# Patient Record
Sex: MALE | Race: OTHER | HISPANIC OR LATINO
[De-identification: names, ages, dates, MRNs, and addresses within clinical notes are randomized per-mention and may not be internally consistent; named-entity substitution may affect disease eponyms.]

---

## 2018-08-22 ENCOUNTER — APPOINTMENT (OUTPATIENT)
Dept: INTERNAL MEDICINE | Facility: CLINIC | Age: 43
End: 2018-08-22
Payer: MEDICAID

## 2018-08-22 VITALS
SYSTOLIC BLOOD PRESSURE: 108 MMHG | BODY MASS INDEX: 26.22 KG/M2 | HEIGHT: 69 IN | DIASTOLIC BLOOD PRESSURE: 72 MMHG | OXYGEN SATURATION: 96 % | HEART RATE: 70 BPM | TEMPERATURE: 98.8 F | WEIGHT: 177 LBS

## 2018-08-22 DIAGNOSIS — B00.1 HERPESVIRAL VESICULAR DERMATITIS: ICD-10-CM

## 2018-08-22 DIAGNOSIS — N50.89 OTHER SPECIFIED DISORDERS OF THE MALE GENITAL ORGANS: ICD-10-CM

## 2018-08-22 PROCEDURE — 36415 COLL VENOUS BLD VENIPUNCTURE: CPT

## 2018-08-22 PROCEDURE — 99214 OFFICE O/P EST MOD 30 MIN: CPT | Mod: 25

## 2018-08-26 NOTE — HISTORY OF PRESENT ILLNESS
[FreeTextEntry8] : \par Here for evaluation of skin rash in left side of face and scrotum.  He states he has had rash on face for past 3-4 days.  He states it is itchy like pimples.  He states there are located on left cheek.  he has never had this before.  He denies any redness , swelling, or pain.  No oral lesions noted.  he states he has a similar lesion on scrotum which appeared around the same time. he states it is a little itchy but does not hurt.  he states he is sexually active with one female partner.  he denies hx of STD\par \par He states he still has lipoma of left upper arm and wants it removed

## 2018-08-26 NOTE — REVIEW OF SYSTEMS
[Genital Lesion] : genital lesion [see HPI] : see HPI [Skin Lesions] : skin lesion [Itching] : itching [Negative] : Musculoskeletal [Fever] : no fever [Chills] : no chills [Feeling Poorly] : not feeling poorly [Feeling Tired] : not feeling tired [Recent Weight Loss (___ Lbs)] : no recent weight loss [Sore Throat] : no sore throat [Chest Pain] : no chest pain [Palpitations] : no palpitations [Lower Ext Edema] : no extremity edema [Shortness Of Breath] : no shortness of breath [Cough] : no cough [SOB on Exertion] : no shortness of breath during exertion [Abdominal Pain] : no abdominal pain [Vomiting] : no vomiting [Diarrhea] : no diarrhea [Dysuria] : no dysuria [Testicular Pain] : no testicular pain [Anxiety] : no anxiety [Depression] : no depression

## 2018-08-26 NOTE — PHYSICAL EXAM
[General Appearance - Alert] : alert [General Appearance - In No Acute Distress] : in no acute distress [General Appearance - Well Nourished] : well nourished [General Appearance - Well Developed] : well developed [General Appearance - Well-Appearing] : healthy appearing [Sclera] : the sclera and conjunctiva were normal [PERRL With Normal Accommodation] : pupils were equal in size, round, and reactive to light [Oropharynx] : the oropharynx was normal [Neck Appearance] : the appearance of the neck was normal [Auscultation Breath Sounds / Voice Sounds] : lungs were clear to auscultation bilaterally [Heart Rate And Rhythm] : heart rate was normal and rhythm regular [Heart Sounds] : normal S1 and S2 [Heart Sounds Gallop] : no gallops [Murmurs] : no murmurs [Heart Sounds Pericardial Friction Rub] : no pericardial rub [Edema] : there was no peripheral edema [Bowel Sounds] : normal bowel sounds [Abdomen Soft] : soft [Abdomen Tenderness] : non-tender [] : no hepato-splenomegaly [Abdomen Mass (___ Cm)] : no abdominal mass palpated [No Focal Deficits] : no focal deficits [Oriented To Time, Place, And Person] : oriented to person, place, and time [Affect] : the affect was normal [Mood] : the mood was normal [Outer Ear] : the ears and nose were normal in appearance [Jugular Venous Distention Increased] : there was no jugular-venous distention [FreeTextEntry1] : on left facial cheek along jaw limp he has a few scattered vesicles and pusules with some scabbing.  On penis her has a vesicular skin lesion with pustule, on shaft of penis he has skin colored soft irregular shaped skin lesions which may be warts.  on left arm her has a 2 cmx 2 cm skin colored soft tissue mass, no erythema

## 2018-08-26 NOTE — ASSESSMENT
[FreeTextEntry1] : \par Genital lesion: possible HSV and genital warts\par -I explained to pt that this could be genital herpes which is STD\par -will check labs\par -Will start valtrex 500mg PO BID x 3 days\par -I adv pt he should discuss with sexual partners\par -I have adv he see dermatology\par \par Left facial skin lesion:\par -impetigo vs herpes vs acne\par -will tx with valtrex and course of doxycycline (R/B/A/side effects discussed and side effects including pill esophagitis and photosensitivity discussed)\par \par -he is to notify office if sx persist or worsen\par -I have adv he see dermatology\par \par Hearing loss/tympanosclerosis:\par -he states he saw ENT and was given drops\par -he states sx resolved\par \par Lipoma or left upper extremity:\par -referred to general surgery\par \par + Depression screening: PHQ 9 score 2\par -he states he does not feel depressed or anxious\par \par Pre-DM:\par -last HgA1c 6.0\par -will check labs\par \par HCM:\par \par CPE was advised\par \par Flu shot advised: pt refused previously\par \par HIV testing offered: he consented to testing 2018\par \par Lipids at goal 2016\par \par Depression screenin2018 PHQ 9 score 2\par \par F/U 2-3 weeks for CPE and lab review

## 2018-08-31 LAB
25(OH)D3 SERPL-MCNC: 28.9 NG/ML
ALBUMIN SERPL ELPH-MCNC: 4.9 G/DL
ALP BLD-CCNC: 64 U/L
ALT SERPL-CCNC: 30 U/L
ANION GAP SERPL CALC-SCNC: 15 MMOL/L
APPEARANCE: CLEAR
AST SERPL-CCNC: 31 U/L
BACTERIA: NEGATIVE
BASOPHILS # BLD AUTO: 0.02 K/UL
BASOPHILS NFR BLD AUTO: 0.4 %
BILIRUB SERPL-MCNC: 0.6 MG/DL
BILIRUBIN URINE: NEGATIVE
BLOOD URINE: NEGATIVE
BUN SERPL-MCNC: 18 MG/DL
C TRACH RRNA SPEC QL NAA+PROBE: NOT DETECTED
CALCIUM SERPL-MCNC: 9.9 MG/DL
CHLORIDE SERPL-SCNC: 101 MMOL/L
CHOLEST SERPL-MCNC: 230 MG/DL
CHOLEST/HDLC SERPL: 5.1 RATIO
CO2 SERPL-SCNC: 25 MMOL/L
COLOR: YELLOW
CREAT SERPL-MCNC: 1.15 MG/DL
EOSINOPHIL # BLD AUTO: 0.14 K/UL
EOSINOPHIL NFR BLD AUTO: 3.1 %
GLUCOSE QUALITATIVE U: NEGATIVE MG/DL
GLUCOSE SERPL-MCNC: 129 MG/DL
HBA1C MFR BLD HPLC: 5.9 %
HBV CORE IGG+IGM SER QL: NONREACTIVE
HBV SURFACE AB SER QL: NONREACTIVE
HBV SURFACE AG SER QL: NONREACTIVE
HCT VFR BLD CALC: 45.8 %
HCV AB SER QL: NONREACTIVE
HCV S/CO RATIO: 0.16 S/CO
HDLC SERPL-MCNC: 45 MG/DL
HGB BLD-MCNC: 15.1 G/DL
HIV1+2 AB SPEC QL IA.RAPID: NONREACTIVE
HSV 1+2 IGG SER IA-IMP: NEGATIVE
HSV 1+2 IGG SER IA-IMP: POSITIVE
HSV1 IGG SER QL: 33.6 INDEX
HSV2 IGG SER QL: 0.09 INDEX
IMM GRANULOCYTES NFR BLD AUTO: 0 %
KETONES URINE: NEGATIVE
LDLC SERPL CALC-MCNC: 160 MG/DL
LEUKOCYTE ESTERASE URINE: NEGATIVE
LYMPHOCYTES # BLD AUTO: 2.02 K/UL
LYMPHOCYTES NFR BLD AUTO: 44 %
MAN DIFF?: NORMAL
MCHC RBC-ENTMCNC: 30.8 PG
MCHC RBC-ENTMCNC: 33 GM/DL
MCV RBC AUTO: 93.5 FL
MICROSCOPIC-UA: NORMAL
MONOCYTES # BLD AUTO: 0.21 K/UL
MONOCYTES NFR BLD AUTO: 4.6 %
N GONORRHOEA RRNA SPEC QL NAA+PROBE: NOT DETECTED
NEUTROPHILS # BLD AUTO: 2.2 K/UL
NEUTROPHILS NFR BLD AUTO: 47.9 %
NITRITE URINE: NEGATIVE
PH URINE: 6
PLATELET # BLD AUTO: 253 K/UL
POTASSIUM SERPL-SCNC: 4.7 MMOL/L
PROT SERPL-MCNC: 7.5 G/DL
PROTEIN URINE: NEGATIVE MG/DL
PSA SERPL-MCNC: 0.45 NG/ML
RBC # BLD: 4.9 M/UL
RBC # FLD: 12.8 %
RED BLOOD CELLS URINE: 1 /HPF
SODIUM SERPL-SCNC: 141 MMOL/L
SOURCE AMPLIFICATION: NORMAL
SPECIFIC GRAVITY URINE: 1.02
SQUAMOUS EPITHELIAL CELLS: 0 /HPF
T PALLIDUM AB SER QL IA: NEGATIVE
T4 FREE SERPL-MCNC: 1.4 NG/DL
TRIGL SERPL-MCNC: 126 MG/DL
TSH SERPL-ACNC: 1.29 UIU/ML
UROBILINOGEN URINE: NEGATIVE MG/DL
WBC # FLD AUTO: 4.59 K/UL
WHITE BLOOD CELLS URINE: 0 /HPF

## 2018-10-03 ENCOUNTER — NON-APPOINTMENT (OUTPATIENT)
Age: 43
End: 2018-10-03

## 2018-10-03 ENCOUNTER — APPOINTMENT (OUTPATIENT)
Dept: INTERNAL MEDICINE | Facility: CLINIC | Age: 43
End: 2018-10-03
Payer: MEDICAID

## 2018-10-03 VITALS
HEIGHT: 69 IN | SYSTOLIC BLOOD PRESSURE: 108 MMHG | HEART RATE: 69 BPM | TEMPERATURE: 98.6 F | OXYGEN SATURATION: 98 % | WEIGHT: 176 LBS | DIASTOLIC BLOOD PRESSURE: 74 MMHG | BODY MASS INDEX: 26.07 KG/M2

## 2018-10-03 DIAGNOSIS — L08.9 LOCAL INFECTION OF THE SKIN AND SUBCUTANEOUS TISSUE, UNSPECIFIED: ICD-10-CM

## 2018-10-03 DIAGNOSIS — M25.562 PAIN IN LEFT KNEE: ICD-10-CM

## 2018-10-03 DIAGNOSIS — H74.02 TYMPANOSCLEROSIS, LEFT EAR: ICD-10-CM

## 2018-10-03 DIAGNOSIS — Z87.39 PERSONAL HISTORY OF OTHER DISEASES OF THE MUSCULOSKELETAL SYSTEM AND CONNECTIVE TISSUE: ICD-10-CM

## 2018-10-03 DIAGNOSIS — M25.522 PAIN IN LEFT ELBOW: ICD-10-CM

## 2018-10-03 DIAGNOSIS — Z86.69 PERSONAL HISTORY OF OTHER DISEASES OF THE NERVOUS SYSTEM AND SENSE ORGANS: ICD-10-CM

## 2018-10-03 PROCEDURE — 90471 IMMUNIZATION ADMIN: CPT

## 2018-10-03 PROCEDURE — 93000 ELECTROCARDIOGRAM COMPLETE: CPT

## 2018-10-03 PROCEDURE — 90715 TDAP VACCINE 7 YRS/> IM: CPT

## 2018-10-03 PROCEDURE — 99396 PREV VISIT EST AGE 40-64: CPT | Mod: 25

## 2018-10-03 RX ORDER — DOXYCYCLINE HYCLATE 100 MG/1
100 TABLET ORAL
Qty: 14 | Refills: 0 | Status: DISCONTINUED | COMMUNITY
Start: 2018-08-22 | End: 2018-10-03

## 2018-10-03 RX ORDER — DOXYCYCLINE 100 MG/1
100 TABLET, FILM COATED ORAL
Qty: 14 | Refills: 0 | Status: DISCONTINUED | COMMUNITY
Start: 2018-08-22 | End: 2018-10-03

## 2018-10-03 NOTE — ASSESSMENT
[FreeTextEntry1] : \par Genital lesions:\par -HSV lesions have resolved\par -he still has what appears to be genital warts\par -I explained to pt again that is was likely STD and he needs to see dermatologist for treatment\par -I also adv he notify sexual partner as she should be seeing her GYN for routine GYN exams and PAPs\par \par Left facial skin lesion:\par -has resolved\par \par Lipoma or left upper extremity:\par -referred to general surgery last visit\par \par Pre-DM:\par -last HgA1c 5.9  and fasting glucose 129 2018\par -I advised low fat/low cholesterol diet and exercise\par \par Hyperlipidemia\par -I advised low fat/low cholesterol diet\par \par Vitamin D deficiency\par -I advised OTC vitamin D 3 1000 units daily\par \par HCM:\par \par CPE 10/3/2018\par \par EKG 10/3/2018\par \par Flu shot advised: pt refused 10/3/2018\par \par Tdap: advised.  R/B discussed.  Tdap given today 10/3/2018\par \par HIV testing: negative 2018\par \par Hepatitis C screenin2018 negative\par \par Hepatitis B: not immune-will check with insurance company to see if hepatitis B vaccine is covered\par \par Depression screening: 10/3/2018 PHQ 2 score 0 (Negative)\par \par PSA 0.45 2018\par \par F/U 3 months for repeat fasting labs

## 2018-10-03 NOTE — PHYSICAL EXAM
[No Acute Distress] : no acute distress [Well Nourished] : well nourished [Well Developed] : well developed [Well-Appearing] : well-appearing [Normal Voice/Communication] : normal voice/communication [Normal Sclera/Conjunctiva] : normal sclera/conjunctiva [PERRL] : pupils equal round and reactive to light [EOMI] : extraocular movements intact [Normal Outer Ear/Nose] : the outer ears and nose were normal in appearance [Normal Oropharynx] : the oropharynx was normal [Normal TMs] : both tympanic membranes were normal [Normal Nasal Mucosa] : the nasal mucosa was normal [No JVD] : no jugular venous distention [Supple] : supple [No Lymphadenopathy] : no lymphadenopathy [Thyroid Normal, No Nodules] : the thyroid was normal and there were no nodules present [No Respiratory Distress] : no respiratory distress  [Clear to Auscultation] : lungs were clear to auscultation bilaterally [No Accessory Muscle Use] : no accessory muscle use [Normal Rate] : normal rate  [Regular Rhythm] : with a regular rhythm [Normal S1, S2] : normal S1 and S2 [No Murmur] : no murmur heard [No Carotid Bruits] : no carotid bruits [No Edema] : there was no peripheral edema [No Extremity Clubbing/Cyanosis] : no extremity clubbing/cyanosis [Soft] : abdomen soft [Non Tender] : non-tender [Non-distended] : non-distended [No Masses] : no abdominal mass palpated [No HSM] : no HSM [Normal Bowel Sounds] : normal bowel sounds [No CVA Tenderness] : no CVA  tenderness [No Spinal Tenderness] : no spinal tenderness [No Joint Swelling] : no joint swelling [No Focal Deficits] : no focal deficits [Normal Affect] : the affect was normal [Alert and Oriented x3] : oriented to person, place, and time [Normal Mood] : the mood was normal [de-identified] : on forskin of penis he has multiple round slihgly raised pink/flesh colored papular lesion

## 2018-10-03 NOTE — HEALTH RISK ASSESSMENT
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

## 2018-10-03 NOTE — REVIEW OF SYSTEMS
[Negative] : Heme/Lymph [Fever] : no fever [Chills] : no chills [Fatigue] : no fatigue [Recent Change In Weight] : ~T no recent weight change [Earache] : no earache [Nasal Discharge] : no nasal discharge [Sore Throat] : no sore throat [Chest Pain] : no chest pain [Palpitations] : no palpitations [Lower Ext Edema] : no lower extremity edema [Shortness Of Breath] : no shortness of breath [Wheezing] : no wheezing [Cough] : no cough [Dyspnea on Exertion] : not dyspnea on exertion [Abdominal Pain] : no abdominal pain [Nausea] : no nausea [Diarrhea] : no diarrhea [Dysuria] : no dysuria [Hematuria] : no hematuria [Frequency] : no frequency [Anxiety] : no anxiety [Depression] : no depression [FreeTextEntry8] : see HPI [de-identified] : see HPI

## 2018-10-03 NOTE — HISTORY OF PRESENT ILLNESS
[de-identified] : Here for CPE\par \par He states he overall feels well\par \par He states he still reports some skin lesions on his foreskin which hurt and stretch when he gets an erection.  He denies penile discharge.  he states he has made appt to see dermatologist

## 2019-01-09 ENCOUNTER — APPOINTMENT (OUTPATIENT)
Dept: INTERNAL MEDICINE | Facility: CLINIC | Age: 44
End: 2019-01-09

## 2019-04-24 ENCOUNTER — APPOINTMENT (OUTPATIENT)
Dept: INTERNAL MEDICINE | Facility: CLINIC | Age: 44
End: 2019-04-24
Payer: MEDICAID

## 2019-04-24 VITALS
WEIGHT: 181 LBS | HEART RATE: 78 BPM | TEMPERATURE: 98.2 F | RESPIRATION RATE: 15 BRPM | HEIGHT: 69 IN | BODY MASS INDEX: 26.81 KG/M2 | DIASTOLIC BLOOD PRESSURE: 70 MMHG | OXYGEN SATURATION: 98 % | SYSTOLIC BLOOD PRESSURE: 124 MMHG

## 2019-04-24 DIAGNOSIS — N20.0 CALCULUS OF KIDNEY: ICD-10-CM

## 2019-04-24 DIAGNOSIS — Z23 ENCOUNTER FOR IMMUNIZATION: ICD-10-CM

## 2019-04-24 PROCEDURE — 99214 OFFICE O/P EST MOD 30 MIN: CPT | Mod: 25

## 2019-04-24 PROCEDURE — 96127 BRIEF EMOTIONAL/BEHAV ASSMT: CPT

## 2019-04-24 NOTE — PHYSICAL EXAM
[No Acute Distress] : no acute distress [Well-Appearing] : well-appearing [Normal Voice/Communication] : normal voice/communication [Normal Sclera/Conjunctiva] : normal sclera/conjunctiva [PERRL] : pupils equal round and reactive to light [Normal Oropharynx] : the oropharynx was normal [No JVD] : no jugular venous distention [Supple] : supple [No Lymphadenopathy] : no lymphadenopathy [No Respiratory Distress] : no respiratory distress  [Thyroid Normal, No Nodules] : the thyroid was normal and there were no nodules present [Clear to Auscultation] : lungs were clear to auscultation bilaterally [No Accessory Muscle Use] : no accessory muscle use [Normal Rate] : normal rate  [Regular Rhythm] : with a regular rhythm [Normal S1, S2] : normal S1 and S2 [No Murmur] : no murmur heard [No Edema] : there was no peripheral edema [Soft] : abdomen soft [No Extremity Clubbing/Cyanosis] : no extremity clubbing/cyanosis [Non Tender] : non-tender [Non-distended] : non-distended [No Masses] : no abdominal mass palpated [No HSM] : no HSM [Normal Bowel Sounds] : normal bowel sounds [No Spinal Tenderness] : no spinal tenderness [No CVA Tenderness] : no CVA  tenderness [No Focal Deficits] : no focal deficits [Normal Affect] : the affect was normal [Alert and Oriented x3] : oriented to person, place, and time [Normal Mood] : the mood was normal [No Rash] : no rash [Normal Insight/Judgement] : insight and judgment were intact

## 2019-04-24 NOTE — END OF VISIT
[FreeTextEntry3] : All medical entries made by the Scribe were at my, Dr. Avery Monte's, direction and personally dictated by me on [4/24/19]. I have reviewed the chart and agree that the record accurately reflects my personal performance of the history, physical exam, assessment and plan. I have also personally directed, reviewed, and agreed with the chart.\par

## 2019-04-24 NOTE — REVIEW OF SYSTEMS
[Recent Change In Weight] : ~T recent weight change [Negative] : Musculoskeletal [Fever] : no fever [Chills] : no chills [Fatigue] : no fatigue [Chest Pain] : no chest pain [Palpitations] : no palpitations [Lower Ext Edema] : no lower extremity edema [Shortness Of Breath] : no shortness of breath [Wheezing] : no wheezing [Cough] : no cough [Dyspnea on Exertion] : not dyspnea on exertion [Abdominal Pain] : no abdominal pain [Nausea] : no nausea [Diarrhea] : no diarrhea [Anxiety] : no anxiety [Depression] : no depression [FreeTextEntry2] : gained 5 Ibs

## 2019-04-24 NOTE — HISTORY OF PRESENT ILLNESS
[FreeTextEntry1] : Here for f/u after ER visit  [de-identified] : He states that he went to the ER at [facility] a few weeks ago and was dx with kidney stone on his right side. He was given IV fluids and was sent to a Urologist. He states he saw Urologist as out patient and had his urine checked and was told everything was normal.   He states that he now feels better. He states he has been drinking plenty of water as advised. \par \par He is currently not taking any medications \par

## 2019-04-24 NOTE — ASSESSMENT
[FreeTextEntry1] : \par Nephrolithiasis:\par -will check labs\par -will order renal US\par -will call hospital to get ER records\par -will try to get Urology consult note-he could not remember exactly who he saw\par -he is to notify office if sx recur\par \par Genital lesions: genital warts\par -he was previously referred to dermatologist\par \par Lipoma or left upper extremity:\par -will refer again to general surgery-he states he wants lesions removed\par \par Pre-DM:\par -last HgA1c 5.9  \par -will check fasting labs\par -I advised low fat/low cholesterol diet and exercise\par \par Hyperlipidemia\par -I advised low fat/low cholesterol diet\par -will check fasting labs\par \par Vitamin D deficiency\par -I advised OTC vitamin D 3 1000 units daily\par -will check vitamin D 25-OH\par \par HCM:\par \par CPE 10/3/2018\par \par EKG 10/3/2018\par \par Flu shot advised: pt refused 10/3/2018\par \par Tdap: 10/3/2018\par \par HIV testing: negative 2018-offered 2019-he declined\par \par Hepatitis C screenin2018 negative\par \par Hepatitis B: not immune-advised previously\par \par Depression screenin2019 PHQ 2 score 0 (Negative)\par \par PSA 0.45 2018\par \par F/U 3 months.  Fasting labs ordered-he will have done at the lab

## 2019-04-24 NOTE — ADDENDUM
[FreeTextEntry1] : I, Rosaline Caruso, acted solely as a scribe for Dr. Monte on this date [4/24/19].\par

## 2019-04-27 ENCOUNTER — FORM ENCOUNTER (OUTPATIENT)
Age: 44
End: 2019-04-27

## 2019-04-28 ENCOUNTER — APPOINTMENT (OUTPATIENT)
Dept: ULTRASOUND IMAGING | Facility: CLINIC | Age: 44
End: 2019-04-28
Payer: MEDICAID

## 2019-04-28 ENCOUNTER — OUTPATIENT (OUTPATIENT)
Dept: OUTPATIENT SERVICES | Facility: HOSPITAL | Age: 44
LOS: 1 days | End: 2019-04-28
Payer: COMMERCIAL

## 2019-04-28 DIAGNOSIS — N20.0 CALCULUS OF KIDNEY: ICD-10-CM

## 2019-04-28 PROCEDURE — 76775 US EXAM ABDO BACK WALL LIM: CPT

## 2019-04-28 PROCEDURE — 76775 US EXAM ABDO BACK WALL LIM: CPT | Mod: 26

## 2019-05-01 DIAGNOSIS — R31.29 OTHER MICROSCOPIC HEMATURIA: ICD-10-CM

## 2019-05-01 LAB
ALBUMIN SERPL ELPH-MCNC: 4.7 G/DL
ALP BLD-CCNC: 62 U/L
ALT SERPL-CCNC: 31 U/L
ANION GAP SERPL CALC-SCNC: 10 MMOL/L
APPEARANCE: CLEAR
AST SERPL-CCNC: 26 U/L
BACTERIA: NEGATIVE
BASOPHILS # BLD AUTO: 0.04 K/UL
BASOPHILS NFR BLD AUTO: 0.7 %
BILIRUB SERPL-MCNC: 0.8 MG/DL
BILIRUBIN URINE: NEGATIVE
BLOOD URINE: NEGATIVE
BUN SERPL-MCNC: 23 MG/DL
CALCIUM SERPL-MCNC: 9.7 MG/DL
CHLORIDE SERPL-SCNC: 102 MMOL/L
CHOLEST SERPL-MCNC: 223 MG/DL
CHOLEST/HDLC SERPL: 5.4 RATIO
CO2 SERPL-SCNC: 28 MMOL/L
COLOR: YELLOW
CREAT SERPL-MCNC: 1.18 MG/DL
EOSINOPHIL # BLD AUTO: 0.22 K/UL
EOSINOPHIL NFR BLD AUTO: 3.8 %
ESTIMATED AVERAGE GLUCOSE: 123 MG/DL
GLUCOSE QUALITATIVE U: NEGATIVE
GLUCOSE SERPL-MCNC: 102 MG/DL
HBA1C MFR BLD HPLC: 5.9 %
HCT VFR BLD CALC: 46.7 %
HDLC SERPL-MCNC: 41 MG/DL
HGB BLD-MCNC: 15.2 G/DL
HYALINE CASTS: 0 /LPF
IMM GRANULOCYTES NFR BLD AUTO: 0.2 %
KETONES URINE: NEGATIVE
LDLC SERPL CALC-MCNC: 160 MG/DL
LEUKOCYTE ESTERASE URINE: NEGATIVE
LYMPHOCYTES # BLD AUTO: 2.31 K/UL
LYMPHOCYTES NFR BLD AUTO: 39.6 %
MAN DIFF?: NORMAL
MCHC RBC-ENTMCNC: 29.9 PG
MCHC RBC-ENTMCNC: 32.5 GM/DL
MCV RBC AUTO: 91.9 FL
MICROSCOPIC-UA: NORMAL
MONOCYTES # BLD AUTO: 0.41 K/UL
MONOCYTES NFR BLD AUTO: 7 %
NEUTROPHILS # BLD AUTO: 2.85 K/UL
NEUTROPHILS NFR BLD AUTO: 48.7 %
NITRITE URINE: NEGATIVE
PH URINE: 6.5
PLATELET # BLD AUTO: 236 K/UL
POTASSIUM SERPL-SCNC: 4.3 MMOL/L
PROT SERPL-MCNC: 7.1 G/DL
PROTEIN URINE: NORMAL
RBC # BLD: 5.08 M/UL
RBC # FLD: 11.8 %
RED BLOOD CELLS URINE: 10 /HPF
SODIUM SERPL-SCNC: 140 MMOL/L
SPECIFIC GRAVITY URINE: 1.03
SQUAMOUS EPITHELIAL CELLS: 3 /HPF
TRIGL SERPL-MCNC: 111 MG/DL
UROBILINOGEN URINE: NORMAL
WBC # FLD AUTO: 5.84 K/UL
WHITE BLOOD CELLS URINE: 12 /HPF

## 2019-05-13 ENCOUNTER — RESULT REVIEW (OUTPATIENT)
Age: 44
End: 2019-05-13

## 2019-05-14 LAB
APPEARANCE: CLEAR
BACTERIA: NEGATIVE
BILIRUBIN URINE: NEGATIVE
BLOOD URINE: NEGATIVE
COLOR: NORMAL
GLUCOSE QUALITATIVE U: NEGATIVE
HYALINE CASTS: 0 /LPF
KETONES URINE: NEGATIVE
LEUKOCYTE ESTERASE URINE: NEGATIVE
MICROSCOPIC-UA: NORMAL
NITRITE URINE: NEGATIVE
PH URINE: 6
PROTEIN URINE: NEGATIVE
RED BLOOD CELLS URINE: 1 /HPF
SPECIFIC GRAVITY URINE: 1.02
SQUAMOUS EPITHELIAL CELLS: 0 /HPF
UROBILINOGEN URINE: NORMAL
WHITE BLOOD CELLS URINE: 0 /HPF

## 2019-05-15 ENCOUNTER — RESULT REVIEW (OUTPATIENT)
Age: 44
End: 2019-05-15

## 2019-05-15 LAB — BACTERIA UR CULT: NORMAL

## 2019-07-24 ENCOUNTER — APPOINTMENT (OUTPATIENT)
Dept: INTERNAL MEDICINE | Facility: CLINIC | Age: 44
End: 2019-07-24

## 2022-04-05 ENCOUNTER — APPOINTMENT (OUTPATIENT)
Dept: INTERNAL MEDICINE | Facility: CLINIC | Age: 47
End: 2022-04-05
Payer: MEDICAID

## 2022-04-05 ENCOUNTER — NON-APPOINTMENT (OUTPATIENT)
Age: 47
End: 2022-04-05

## 2022-04-05 VITALS
HEART RATE: 69 BPM | RESPIRATION RATE: 15 BRPM | TEMPERATURE: 97.9 F | BODY MASS INDEX: 26.07 KG/M2 | OXYGEN SATURATION: 98 % | SYSTOLIC BLOOD PRESSURE: 124 MMHG | HEIGHT: 69 IN | WEIGHT: 176 LBS | DIASTOLIC BLOOD PRESSURE: 90 MMHG

## 2022-04-05 DIAGNOSIS — M54.50 LOW BACK PAIN, UNSPECIFIED: ICD-10-CM

## 2022-04-05 DIAGNOSIS — A63.0 ANOGENITAL (VENEREAL) WARTS: ICD-10-CM

## 2022-04-05 DIAGNOSIS — Z11.4 ENCOUNTER FOR SCREENING FOR HUMAN IMMUNODEFICIENCY VIRUS [HIV]: ICD-10-CM

## 2022-04-05 PROCEDURE — 93000 ELECTROCARDIOGRAM COMPLETE: CPT | Mod: 59

## 2022-04-05 PROCEDURE — 99396 PREV VISIT EST AGE 40-64: CPT | Mod: 25

## 2022-04-05 PROCEDURE — 96127 BRIEF EMOTIONAL/BEHAV ASSMT: CPT

## 2022-04-05 RX ORDER — VALACYCLOVIR 500 MG/1
500 TABLET, FILM COATED ORAL
Qty: 6 | Refills: 0 | Status: DISCONTINUED | COMMUNITY
Start: 2018-08-22 | End: 2022-04-05

## 2022-04-07 ENCOUNTER — OUTPATIENT (OUTPATIENT)
Dept: OUTPATIENT SERVICES | Facility: HOSPITAL | Age: 47
LOS: 1 days | End: 2022-04-07
Payer: COMMERCIAL

## 2022-04-07 ENCOUNTER — APPOINTMENT (OUTPATIENT)
Dept: RADIOLOGY | Facility: CLINIC | Age: 47
End: 2022-04-07
Payer: MEDICAID

## 2022-04-07 DIAGNOSIS — M54.50 LOW BACK PAIN, UNSPECIFIED: ICD-10-CM

## 2022-04-07 LAB — HEMOCCULT STL QL IA: NEGATIVE

## 2022-04-07 PROCEDURE — 72100 X-RAY EXAM L-S SPINE 2/3 VWS: CPT

## 2022-04-07 PROCEDURE — 72100 X-RAY EXAM L-S SPINE 2/3 VWS: CPT | Mod: 26

## 2022-04-08 ENCOUNTER — NON-APPOINTMENT (OUTPATIENT)
Age: 47
End: 2022-04-08

## 2022-04-08 ENCOUNTER — RESULT CHARGE (OUTPATIENT)
Age: 47
End: 2022-04-08

## 2022-04-09 NOTE — HEALTH RISK ASSESSMENT
[Never] : Never [Yes] : Yes [No] : In the past 12 months have you used drugs other than those required for medical reasons? 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

## 2022-04-09 NOTE — PHYSICAL EXAM
[No Acute Distress] : no acute distress [Well Nourished] : well nourished [Well Developed] : well developed [Well-Appearing] : well-appearing [Normal Voice/Communication] : normal voice/communication [Normal Sclera/Conjunctiva] : normal sclera/conjunctiva [PERRL] : pupils equal round and reactive to light [EOMI] : extraocular movements intact [Normal Outer Ear/Nose] : the outer ears and nose were normal in appearance [Normal Oropharynx] : the oropharynx was normal [Normal TMs] : both tympanic membranes were normal [Normal Nasal Mucosa] : the nasal mucosa was normal [No JVD] : no jugular venous distention [No Lymphadenopathy] : no lymphadenopathy [Supple] : supple [Thyroid Normal, No Nodules] : the thyroid was normal and there were no nodules present [No Respiratory Distress] : no respiratory distress  [No Accessory Muscle Use] : no accessory muscle use [Clear to Auscultation] : lungs were clear to auscultation bilaterally [Normal Rate] : normal rate  [Regular Rhythm] : with a regular rhythm [Normal S1, S2] : normal S1 and S2 [No Murmur] : no murmur heard [No Carotid Bruits] : no carotid bruits [No Edema] : there was no peripheral edema [No Extremity Clubbing/Cyanosis] : no extremity clubbing/cyanosis [Soft] : abdomen soft [Non Tender] : non-tender [Non-distended] : non-distended [No Masses] : no abdominal mass palpated [No HSM] : no HSM [Normal Bowel Sounds] : normal bowel sounds [No CVA Tenderness] : no CVA  tenderness [No Spinal Tenderness] : no spinal tenderness [No Joint Swelling] : no joint swelling [Grossly Normal Strength/Tone] : grossly normal strength/tone [No Rash] : no rash [No Focal Deficits] : no focal deficits [Normal Affect] : the affect was normal [Alert and Oriented x3] : oriented to person, place, and time [Normal Mood] : the mood was normal [Normal Insight/Judgement] : insight and judgment were intact [de-identified] : Negative straight leg raise bilaterally [de-identified] : Right upper extremity on inner side of biceps area he has a 2 cm x 2 cm round soft tissue mass, nontender, no erythema

## 2022-04-09 NOTE — ASSESSMENT
[FreeTextEntry1] : \par Low back pain\par -He can continue to use OTC Tylenol as needed \par -check x-ray of lumbar spine\par -He should notify office if symptoms persist or worsen\par \par Soft tissue mass of left upper extremity\par -This is likely a lipoma\par -He would like to have this removed\par -Referred to general surgery\par \par History of nephrolithiasis:\par -No recurrences\par -renal US 2019 was normal\par -He was previously seen by urologist\par \par Genital warts\par -He reports he continues to see dermatology periodically for laser treatments\par \par Pre-DM:\par -last HgA1c 5.9  \par -will check fasting labs\par -I advised low fat/low cholesterol diet and exercise\par \par Hyperlipidemia\par -I advised low fat/low cholesterol diet\par -will check fasting labs\par \par Vitamin D deficiency\par -will check vitamin D 25-OH\par \par BP today mildly elevated at 124/90\par -He should adhere to a low-fat, low-cholesterol, low-salt diet\par -Follow-up in 3 months for BP check\par \par HCM:\par \par CPE 2022\par \par EKG 2022 NSR at 69 with no ST abnormalities\par \par Flu shot advised: pt refused 2022\par \par Tdap: 10/3/2018\par \par Covid vaccine: Pfizer x 2-COVID booster advised 6 months after second dose\par \par HIV testing: Offered testing 2022 and he consented\par \par Hepatitis C screenin2018 negative\par \par Hepatitis B: not immune-advised previously\par \par Depression screenin2022 PHQ 2 score 0 (Negative)\par \par PSA 0.45 2018-PSA ordered today\par \par Colonoscopy: Advised as he is now 47-referred to GI\par \par FIT test kit given to him today 2022\par \par F/U 3 months.  Fasting labs ordered-he will have done at the lab

## 2022-04-09 NOTE — HISTORY OF PRESENT ILLNESS
[de-identified] : Here for CPE\par \par He report occasional low back pain from time to time.  He states he feels like a canal is being stuck in his back.  He states he has had symptoms for a few months.  He denies any recent trauma or falls.  He states he has been using OTC Tylenol as needed.\par \par He also reports that he continues to have soft tissue mass of left upper extremity and he would like to have this removed

## 2022-04-09 NOTE — REVIEW OF SYSTEMS
[Negative] : Psychiatric [Fever] : no fever [Chills] : no chills [Fatigue] : no fatigue [Recent Change In Weight] : ~T no recent weight change [Chest Pain] : no chest pain [Palpitations] : no palpitations [Lower Ext Edema] : no lower extremity edema [Shortness Of Breath] : no shortness of breath [Wheezing] : no wheezing [Cough] : no cough [Dyspnea on Exertion] : not dyspnea on exertion [Abdominal Pain] : no abdominal pain [Nausea] : no nausea [Diarrhea] : no diarrhea [Vomiting] : no vomiting [Skin Rash] : no skin rash [Anxiety] : no anxiety [Depression] : no depression [FreeTextEntry9] : See HPI [de-identified] : See HPI

## 2022-04-14 ENCOUNTER — NON-APPOINTMENT (OUTPATIENT)
Age: 47
End: 2022-04-14

## 2022-07-05 ENCOUNTER — APPOINTMENT (OUTPATIENT)
Dept: INTERNAL MEDICINE | Facility: CLINIC | Age: 47
End: 2022-07-05

## 2022-07-05 VITALS
TEMPERATURE: 98.1 F | HEART RATE: 76 BPM | HEIGHT: 69 IN | DIASTOLIC BLOOD PRESSURE: 72 MMHG | OXYGEN SATURATION: 98 % | WEIGHT: 176 LBS | SYSTOLIC BLOOD PRESSURE: 110 MMHG | BODY MASS INDEX: 26.07 KG/M2 | RESPIRATION RATE: 16 BRPM

## 2022-07-05 PROCEDURE — 99213 OFFICE O/P EST LOW 20 MIN: CPT

## 2022-07-05 RX ORDER — ERGOCALCIFEROL 1.25 MG/1
1.25 MG CAPSULE, LIQUID FILLED ORAL
Qty: 12 | Refills: 0 | Status: COMPLETED | COMMUNITY
Start: 2022-04-14 | End: 2022-07-05

## 2022-07-05 NOTE — REVIEW OF SYSTEMS
[Negative] : Integumentary [Fever] : no fever [Chills] : no chills [Fatigue] : no fatigue [Recent Change In Weight] : ~T no recent weight change [Chest Pain] : no chest pain [Palpitations] : no palpitations [Lower Ext Edema] : no lower extremity edema [Shortness Of Breath] : no shortness of breath [Wheezing] : no wheezing [Cough] : no cough [Dyspnea on Exertion] : not dyspnea on exertion [Abdominal Pain] : no abdominal pain [Nausea] : no nausea [Diarrhea] : no diarrhea [Vomiting] : no vomiting

## 2022-07-05 NOTE — PHYSICAL EXAM
[No Acute Distress] : no acute distress [Well Nourished] : well nourished [Well Developed] : well developed [Well-Appearing] : well-appearing [Normal Voice/Communication] : normal voice/communication [Normal Sclera/Conjunctiva] : normal sclera/conjunctiva [PERRL] : pupils equal round and reactive to light [Normal Oropharynx] : the oropharynx was normal [No JVD] : no jugular venous distention [No Lymphadenopathy] : no lymphadenopathy [Supple] : supple [Thyroid Normal, No Nodules] : the thyroid was normal and there were no nodules present [No Respiratory Distress] : no respiratory distress  [No Accessory Muscle Use] : no accessory muscle use [Clear to Auscultation] : lungs were clear to auscultation bilaterally [Normal Rate] : normal rate  [Regular Rhythm] : with a regular rhythm [Normal S1, S2] : normal S1 and S2 [No Murmur] : no murmur heard [No Edema] : there was no peripheral edema [No Extremity Clubbing/Cyanosis] : no extremity clubbing/cyanosis [Soft] : abdomen soft [Non Tender] : non-tender [Non-distended] : non-distended [No Masses] : no abdominal mass palpated [No HSM] : no HSM [Normal Bowel Sounds] : normal bowel sounds [No Rash] : no rash [No Focal Deficits] : no focal deficits [Normal Affect] : the affect was normal [Alert and Oriented x3] : oriented to person, place, and time [Normal Mood] : the mood was normal [Normal Insight/Judgement] : insight and judgment were intact [de-identified] : Right upper extremity on inner side of biceps area he has a 2 cm x 2 cm round soft tissue mass, no erythema

## 2022-07-05 NOTE — HISTORY OF PRESENT ILLNESS
[de-identified] : Here today for BP check and lab review\par \par He states he feels well and denies any complaints

## 2022-07-05 NOTE — ASSESSMENT
[FreeTextEntry1] : \par Previously reported low back pain\par -He denies any symptoms today\par -x-ray of lumbar spine was normal\par \par Soft tissue mass of left upper extremity\par -This is likely a lipoma\par -He would like to have this removed\par -Referred to general surgery again today.  We will have office staff make him an appointment to see Dr. Welsh\par \par Pre-DM:\par -HgA1c 5.9 2022\par -I advised low fat/low cholesterol diet, low carbohydrate diet, and exercise\par -His BMI is 25.99\par \par Hyperlipidemia\par -I advised low fat/low cholesterol diet\par -Recent total cholesterol was 222 2022\par -Repeat fasting labs in 4 months\par \par Vitamin D deficiency\par -He took weekly ergocalciferol 50,000 units x 12 weeks\par -I advise he now start OTC vitamin D3 1000 units once a day\par -Repeat vitamin D 25 [place] level again in 4 months\par \par \par HCM:\par \par CPE 2022\par \par EKG 2022 \par \par Flu shot advised: pt refused 2022\par \par Tdap: 10/3/2018\par \par Covid vaccine: Pfizer x 2-advised COVID booster advised 6 months after second dose\par \par HIV testin2022 negative\par \par Hepatitis C screenin2018 negative\par \par Hepatitis B: not immune-advised previously\par \par Depression screenin2022 PHQ 2 score 0 (Negative)\par \par PSA 0.46 2022\par \par Colonoscopy: I have again advised that he see GI for screening colonoscopy and referral printed out for patient again today\par \par FIT test: 2022 negative\par \par F/U 4 months for fasting labs

## 2022-07-14 ENCOUNTER — LABORATORY RESULT (OUTPATIENT)
Age: 47
End: 2022-07-14

## 2022-07-14 ENCOUNTER — APPOINTMENT (OUTPATIENT)
Dept: SURGERY | Facility: CLINIC | Age: 47
End: 2022-07-14

## 2022-07-14 VITALS
RESPIRATION RATE: 15 BRPM | BODY MASS INDEX: 25.18 KG/M2 | OXYGEN SATURATION: 97 % | HEART RATE: 78 BPM | HEIGHT: 69 IN | TEMPERATURE: 98.1 F | WEIGHT: 170 LBS | SYSTOLIC BLOOD PRESSURE: 120 MMHG | DIASTOLIC BLOOD PRESSURE: 70 MMHG

## 2022-07-14 PROCEDURE — 11403 EXC TR-EXT B9+MARG 2.1-3CM: CPT

## 2022-07-14 PROCEDURE — 12031 INTMD RPR S/A/T/EXT 2.5 CM/<: CPT

## 2022-07-26 ENCOUNTER — APPOINTMENT (OUTPATIENT)
Dept: SURGERY | Facility: CLINIC | Age: 47
End: 2022-07-26

## 2022-07-26 PROCEDURE — 99024 POSTOP FOLLOW-UP VISIT: CPT

## 2022-07-26 NOTE — PHYSICAL EXAM
[Calm] : calm [de-identified] : Seroma at excision site. Aspirated. Sutures removed with steris placed. ace wrap x 48h.

## 2022-08-18 ENCOUNTER — APPOINTMENT (OUTPATIENT)
Dept: SURGERY | Facility: CLINIC | Age: 47
End: 2022-08-18

## 2022-08-18 DIAGNOSIS — D17.22 BENIGN LIPOMATOUS NEOPLASM OF SKIN AND SUBCUTANEOUS TISSUE OF LEFT ARM: ICD-10-CM

## 2022-08-18 PROCEDURE — 99212 OFFICE O/P EST SF 10 MIN: CPT

## 2022-08-18 NOTE — HISTORY OF PRESENT ILLNESS
[de-identified] : 6 weeks post excision. Some pain in L arm to wrist. No sign of venous thrombosis. No swelling now. Arterial pulse intact.

## 2022-08-18 NOTE — ASSESSMENT
[FreeTextEntry1] : Arm pain post excision of subcutaneous lipoma. Doubt deeper structure injury.\par

## 2022-08-18 NOTE — PHYSICAL EXAM
[JVD] : no jugular venous distention  [Normal Breath Sounds] : Normal breath sounds [Normal Heart Sounds] : normal heart sounds [2+] : left 2+ [Abdomen Tenderness] : ~T ~M No abdominal tenderness [Calm] : calm [de-identified] : NC/AT PER

## 2022-08-20 ENCOUNTER — APPOINTMENT (OUTPATIENT)
Dept: ULTRASOUND IMAGING | Facility: CLINIC | Age: 47
End: 2022-08-20

## 2022-08-20 ENCOUNTER — OUTPATIENT (OUTPATIENT)
Dept: OUTPATIENT SERVICES | Facility: HOSPITAL | Age: 47
LOS: 1 days | End: 2022-08-20

## 2022-08-20 DIAGNOSIS — D17.22 BENIGN LIPOMATOUS NEOPLASM OF SKIN AND SUBCUTANEOUS TISSUE OF LEFT ARM: ICD-10-CM

## 2022-08-20 PROCEDURE — 93971 EXTREMITY STUDY: CPT | Mod: 26,LT

## 2022-08-22 ENCOUNTER — NON-APPOINTMENT (OUTPATIENT)
Age: 47
End: 2022-08-22

## 2022-11-09 ENCOUNTER — APPOINTMENT (OUTPATIENT)
Dept: INTERNAL MEDICINE | Facility: CLINIC | Age: 47
End: 2022-11-09

## 2022-11-09 VITALS
HEART RATE: 75 BPM | HEIGHT: 69 IN | BODY MASS INDEX: 23.99 KG/M2 | RESPIRATION RATE: 16 BRPM | TEMPERATURE: 97.4 F | SYSTOLIC BLOOD PRESSURE: 116 MMHG | OXYGEN SATURATION: 97 % | WEIGHT: 162 LBS | DIASTOLIC BLOOD PRESSURE: 75 MMHG

## 2022-11-09 PROCEDURE — 99213 OFFICE O/P EST LOW 20 MIN: CPT

## 2022-11-09 RX ORDER — IMIQUIMOD 50 MG/G
5 CREAM TOPICAL
Qty: 12 | Refills: 0 | Status: DISCONTINUED | COMMUNITY
Start: 2022-06-01 | End: 2022-11-09

## 2022-11-09 RX ORDER — MUPIROCIN 20 MG/G
2 OINTMENT TOPICAL
Qty: 22 | Refills: 0 | Status: DISCONTINUED | COMMUNITY
Start: 2022-06-01 | End: 2022-11-09

## 2022-11-09 NOTE — REVIEW OF SYSTEMS
[Negative] : Psychiatric [Fever] : no fever [Chills] : no chills [Chest Pain] : no chest pain [Palpitations] : no palpitations [Lower Ext Edema] : no lower extremity edema [Shortness Of Breath] : no shortness of breath [Wheezing] : no wheezing [Cough] : no cough [Dyspnea on Exertion] : not dyspnea on exertion [Abdominal Pain] : no abdominal pain [Nausea] : no nausea [Diarrhea] : no diarrhea [Vomiting] : no vomiting

## 2022-11-09 NOTE — ASSESSMENT
[FreeTextEntry1] : \par \par Pre-DM:\par -HgA1c 5.9 2022\par -I advised low fat/low cholesterol diet, low carbohydrate diet, and exercise\par -His BMI is now 23.9\par -Check hemoglobin A1c\par \par Hyperlipidemia\par -I advised low fat/low cholesterol diet\par -Recent total cholesterol was 222 2022\par -Check fasting lipids\par \par Vitamin D deficiency\par -Check vitamin D 25 OH\par \par \par HCM:\par \par CPE 2022\par \par EKG 2022 \par \par Flu shot advised: pt refused 2022\par \par Tdap: 10/3/2018\par \par Covid vaccine: Pfizer x 2-advised COVID booster advised \par \par HIV testin2022 negative\par \par Hepatitis C screenin2018 negative\par \par Hepatitis B: not immune-advised previously\par \par Depression screenin2022 PHQ 2 score 0 (Negative)\par \par PSA 0.46 2022\par \par Colonoscopy: He was previously referred to GI for screening colonoscopy\par \par FIT test: 2022 negative\par \par F/U 6 months for CPE.  Fasting labs ordered which he will have done at the lab

## 2022-11-09 NOTE — HISTORY OF PRESENT ILLNESS
[de-identified] : Here today for follow-up of prediabetes, hyperlipidemia, vitamin D deficiency\par \par He states he feels well and denies any complaints\par \par He has lost about 14 pounds by being very active at work, eating less carbs, eating more vegetables, and decreasing portion sizes of meals

## 2022-11-09 NOTE — PHYSICAL EXAM
[No Acute Distress] : no acute distress [Well Nourished] : well nourished [Well Developed] : well developed [Well-Appearing] : well-appearing [Normal Voice/Communication] : normal voice/communication [Normal Sclera/Conjunctiva] : normal sclera/conjunctiva [PERRL] : pupils equal round and reactive to light [Normal Oropharynx] : the oropharynx was normal [No JVD] : no jugular venous distention [No Lymphadenopathy] : no lymphadenopathy [Supple] : supple [Thyroid Normal, No Nodules] : the thyroid was normal and there were no nodules present [No Respiratory Distress] : no respiratory distress  [No Accessory Muscle Use] : no accessory muscle use [Clear to Auscultation] : lungs were clear to auscultation bilaterally [Normal Rate] : normal rate  [Regular Rhythm] : with a regular rhythm [Normal S1, S2] : normal S1 and S2 [No Murmur] : no murmur heard [No Edema] : there was no peripheral edema [No Extremity Clubbing/Cyanosis] : no extremity clubbing/cyanosis [No Rash] : no rash [Normal Affect] : the affect was normal [Alert and Oriented x3] : oriented to person, place, and time [Normal Mood] : the mood was normal [Normal Insight/Judgement] : insight and judgment were intact [Soft] : abdomen soft [Non Tender] : non-tender [Non-distended] : non-distended [No Masses] : no abdominal mass palpated [No HSM] : no HSM [Normal Bowel Sounds] : normal bowel sounds

## 2022-11-20 LAB
25(OH)D3 SERPL-MCNC: 23.9 NG/ML
ALBUMIN SERPL ELPH-MCNC: 4.7 G/DL
ALP BLD-CCNC: 62 U/L
ALT SERPL-CCNC: 22 U/L
ANION GAP SERPL CALC-SCNC: 11 MMOL/L
AST SERPL-CCNC: 25 U/L
BILIRUB SERPL-MCNC: 0.6 MG/DL
BUN SERPL-MCNC: 14 MG/DL
CALCIUM SERPL-MCNC: 10 MG/DL
CHLORIDE SERPL-SCNC: 101 MMOL/L
CHOLEST SERPL-MCNC: 223 MG/DL
CO2 SERPL-SCNC: 28 MMOL/L
CREAT SERPL-MCNC: 1.03 MG/DL
EGFR: 91 ML/MIN/1.73M2
ESTIMATED AVERAGE GLUCOSE: 123 MG/DL
GLUCOSE SERPL-MCNC: 105 MG/DL
HBA1C MFR BLD HPLC: 5.9 %
HDLC SERPL-MCNC: 53 MG/DL
LDLC SERPL CALC-MCNC: 147 MG/DL
NONHDLC SERPL-MCNC: 170 MG/DL
POTASSIUM SERPL-SCNC: 5 MMOL/L
PROT SERPL-MCNC: 7 G/DL
SODIUM SERPL-SCNC: 140 MMOL/L
TRIGL SERPL-MCNC: 119 MG/DL

## 2022-11-29 ENCOUNTER — NON-APPOINTMENT (OUTPATIENT)
Age: 47
End: 2022-11-29

## 2023-05-10 ENCOUNTER — APPOINTMENT (OUTPATIENT)
Dept: INTERNAL MEDICINE | Facility: CLINIC | Age: 48
End: 2023-05-10
Payer: MEDICAID

## 2023-05-10 ENCOUNTER — NON-APPOINTMENT (OUTPATIENT)
Age: 48
End: 2023-05-10

## 2023-05-10 VITALS
HEART RATE: 61 BPM | HEIGHT: 69 IN | DIASTOLIC BLOOD PRESSURE: 80 MMHG | RESPIRATION RATE: 16 BRPM | TEMPERATURE: 98.1 F | OXYGEN SATURATION: 96 % | SYSTOLIC BLOOD PRESSURE: 126 MMHG | BODY MASS INDEX: 24.73 KG/M2 | WEIGHT: 167 LBS

## 2023-05-10 DIAGNOSIS — Z82.49 FAMILY HISTORY OF ISCHEMIC HEART DISEASE AND OTHER DISEASES OF THE CIRCULATORY SYSTEM: ICD-10-CM

## 2023-05-10 DIAGNOSIS — E55.9 VITAMIN D DEFICIENCY, UNSPECIFIED: ICD-10-CM

## 2023-05-10 DIAGNOSIS — Z00.00 ENCOUNTER FOR GENERAL ADULT MEDICAL EXAMINATION W/OUT ABNORMAL FINDINGS: ICD-10-CM

## 2023-05-10 DIAGNOSIS — Z11.3 ENCOUNTER FOR SCREENING FOR INFECTIONS WITH A PREDOMINANTLY SEXUAL MODE OF TRANSMISSION: ICD-10-CM

## 2023-05-10 PROCEDURE — 99396 PREV VISIT EST AGE 40-64: CPT | Mod: 25

## 2023-05-10 PROCEDURE — 36415 COLL VENOUS BLD VENIPUNCTURE: CPT

## 2023-05-10 PROCEDURE — 93000 ELECTROCARDIOGRAM COMPLETE: CPT

## 2023-05-14 PROBLEM — E55.9 VITAMIN D DEFICIENCY, UNSPECIFIED: Status: ACTIVE | Noted: 2019-04-24

## 2023-05-14 PROBLEM — Z11.3 SCREEN FOR STD (SEXUALLY TRANSMITTED DISEASE): Status: ACTIVE | Noted: 2023-05-10

## 2023-05-14 LAB
25(OH)D3 SERPL-MCNC: 21.2 NG/ML
ALBUMIN SERPL ELPH-MCNC: 4.6 G/DL
ALP BLD-CCNC: 58 U/L
ALT SERPL-CCNC: 40 U/L
ANION GAP SERPL CALC-SCNC: 13 MMOL/L
APPEARANCE: CLEAR
AST SERPL-CCNC: 59 U/L
BACTERIA: NEGATIVE /HPF
BASOPHILS # BLD AUTO: 0.04 K/UL
BASOPHILS NFR BLD AUTO: 0.8 %
BILIRUB SERPL-MCNC: 0.6 MG/DL
BILIRUBIN URINE: NEGATIVE
BLOOD URINE: NEGATIVE
BUN SERPL-MCNC: 16 MG/DL
C TRACH RRNA SPEC QL NAA+PROBE: NOT DETECTED
CALCIUM SERPL-MCNC: 9.5 MG/DL
CAST: 0 /LPF
CHLORIDE SERPL-SCNC: 102 MMOL/L
CHOLEST SERPL-MCNC: 190 MG/DL
CO2 SERPL-SCNC: 24 MMOL/L
COLOR: YELLOW
CREAT SERPL-MCNC: 1.04 MG/DL
EGFR: 90 ML/MIN/1.73M2
EOSINOPHIL # BLD AUTO: 0.17 K/UL
EOSINOPHIL NFR BLD AUTO: 3.2 %
EPITHELIAL CELLS: 0 /HPF
ESTIMATED AVERAGE GLUCOSE: 123 MG/DL
GLUCOSE QUALITATIVE U: NEGATIVE MG/DL
GLUCOSE SERPL-MCNC: 100 MG/DL
HBA1C MFR BLD HPLC: 5.9 %
HBV CORE IGG+IGM SER QL: NONREACTIVE
HBV SURFACE AB SER QL: NONREACTIVE
HBV SURFACE AG SER QL: NONREACTIVE
HCT VFR BLD CALC: 43.2 %
HCV AB SER QL: NONREACTIVE
HCV S/CO RATIO: 0.12 S/CO
HDLC SERPL-MCNC: 55 MG/DL
HGB BLD-MCNC: 14.4 G/DL
HIV1+2 AB SPEC QL IA.RAPID: NONREACTIVE
IMM GRANULOCYTES NFR BLD AUTO: 0.2 %
KETONES URINE: NEGATIVE MG/DL
LDLC SERPL CALC-MCNC: 121 MG/DL
LEUKOCYTE ESTERASE URINE: NEGATIVE
LYMPHOCYTES # BLD AUTO: 2.32 K/UL
LYMPHOCYTES NFR BLD AUTO: 43.9 %
MAN DIFF?: NORMAL
MCHC RBC-ENTMCNC: 30.4 PG
MCHC RBC-ENTMCNC: 33.3 GM/DL
MCV RBC AUTO: 91.1 FL
MICROSCOPIC-UA: NORMAL
MONOCYTES # BLD AUTO: 0.33 K/UL
MONOCYTES NFR BLD AUTO: 6.2 %
N GONORRHOEA RRNA SPEC QL NAA+PROBE: NOT DETECTED
NEUTROPHILS # BLD AUTO: 2.42 K/UL
NEUTROPHILS NFR BLD AUTO: 45.7 %
NITRITE URINE: NEGATIVE
NONHDLC SERPL-MCNC: 135 MG/DL
PH URINE: 6
PLATELET # BLD AUTO: 252 K/UL
POTASSIUM SERPL-SCNC: 4.2 MMOL/L
PROT SERPL-MCNC: 6.6 G/DL
PROTEIN URINE: NEGATIVE MG/DL
PSA SERPL-MCNC: 0.4 NG/ML
RBC # BLD: 4.74 M/UL
RBC # FLD: 11.7 %
RED BLOOD CELLS URINE: 0 /HPF
SODIUM SERPL-SCNC: 139 MMOL/L
SOURCE AMPLIFICATION: NORMAL
SPECIFIC GRAVITY URINE: 1.01
T PALLIDUM AB SER QL IA: NEGATIVE
T4 FREE SERPL-MCNC: 1.2 NG/DL
TRIGL SERPL-MCNC: 68 MG/DL
TSH SERPL-ACNC: 0.98 UIU/ML
UROBILINOGEN URINE: 0.2 MG/DL
WBC # FLD AUTO: 5.29 K/UL
WHITE BLOOD CELLS URINE: 0 /HPF

## 2023-05-14 NOTE — REVIEW OF SYSTEMS
[Negative] : Neurological [Fever] : no fever [Chills] : no chills [Fatigue] : no fatigue [Chest Pain] : no chest pain [Palpitations] : no palpitations [Lower Ext Edema] : no lower extremity edema [Shortness Of Breath] : no shortness of breath [Wheezing] : no wheezing [Cough] : no cough [Dyspnea on Exertion] : not dyspnea on exertion [Abdominal Pain] : no abdominal pain [Nausea] : no nausea [Diarrhea] : no diarrhea [Vomiting] : no vomiting [FreeTextEntry9] : See HPI

## 2023-05-14 NOTE — HISTORY OF PRESENT ILLNESS
[de-identified] : Here for CPE\par \par He states he overall feels well but reports that over the last 2 to 3 months he has been having some left knee pain.  He denies any recent trauma or falls.  He denies any knee swelling.  He has not been taking any medication for his symptoms.

## 2023-05-14 NOTE — PHYSICAL EXAM
[No Acute Distress] : no acute distress [Well Nourished] : well nourished [Well Developed] : well developed [Well-Appearing] : well-appearing [Normal Voice/Communication] : normal voice/communication [Normal Sclera/Conjunctiva] : normal sclera/conjunctiva [PERRL] : pupils equal round and reactive to light [Normal Oropharynx] : the oropharynx was normal [No JVD] : no jugular venous distention [No Lymphadenopathy] : no lymphadenopathy [Supple] : supple [Thyroid Normal, No Nodules] : the thyroid was normal and there were no nodules present [No Respiratory Distress] : no respiratory distress  [No Accessory Muscle Use] : no accessory muscle use [Clear to Auscultation] : lungs were clear to auscultation bilaterally [Normal Rate] : normal rate  [Regular Rhythm] : with a regular rhythm [Normal S1, S2] : normal S1 and S2 [No Murmur] : no murmur heard [No Edema] : there was no peripheral edema [No Extremity Clubbing/Cyanosis] : no extremity clubbing/cyanosis [Soft] : abdomen soft [Non Tender] : non-tender [Non-distended] : non-distended [No Masses] : no abdominal mass palpated [No HSM] : no HSM [Normal Bowel Sounds] : normal bowel sounds [No Rash] : no rash [Normal Affect] : the affect was normal [Alert and Oriented x3] : oriented to person, place, and time [Normal Mood] : the mood was normal [Normal Insight/Judgement] : insight and judgment were intact [EOMI] : extraocular movements intact [Normal Outer Ear/Nose] : the outer ears and nose were normal in appearance [Normal TMs] : both tympanic membranes were normal [Normal Nasal Mucosa] : the nasal mucosa was normal [No Carotid Bruits] : no carotid bruits [No Spinal Tenderness] : no spinal tenderness [No Joint Swelling] : no joint swelling [No Skin Lesions] : no skin lesions [No Focal Deficits] : no focal deficits [de-identified] : Left knee with full range of motion, no tenderness, no swelling, no deformity.  There is no joint laxity. [de-identified] : No calf tenderness

## 2023-05-14 NOTE — ASSESSMENT
[FreeTextEntry1] : \par Left knee pain\par -Check x-ray of left knee\par -I have advised trial of meloxicam to use as needed\par -He is to notify office if symptoms persist or worsen\par \par Pre-DM:\par -HgA1c 5.9 2022\par -I advised low fat/low cholesterol diet, low carbohydrate diet, and exercise\par -His BMI is 24.66\par -Check fasting labs\par \par Hyperlipidemia\par -Check fasting labs\par \par Vitamin D deficiency\par -He was previously advised to take vitamin D3 1000 units once a day\par -Check vitamin D 25 OH\par \par \par HCM:\par \par CPE 5/10/2023\par \par EKG 5/10/2023 sinus bradycardia 58 with LVH, no ST abnormalities\par \par Flu shot advised: pt refused 2022\par \par Tdap: 10/3/2018\par \par Covid vaccine: Pfizer x 2-advised COVID booster advised \par \par HIV testin2022 negative-HIV testing offered 5/10/2023 and he consented to testing.  He requested STD screening in general\par \par Hepatitis C screenin2018 negative\par \par Hepatitis B: not immune-advised previously\par \par Depression screenin/10/2023 PHQ 2 score 0\par \par PSA 0.46 2022-check PSA\par \par Colonoscopy: I have again today 5/10/2023 advised that he see GI for screening colonoscopy.  Referral given\par \par FIT test: 2022 negative-FIT test kit given to patient today 5/10/2023\par \par F/U 6 months.  Fasting labs ordered and drawn in office today

## 2023-05-14 NOTE — HEALTH RISK ASSESSMENT
[Yes] : Yes [No] : In the past 12 months have you used drugs other than those required for medical reasons? No [0] : 2) Feeling down, depressed, or hopeless: Not at all (0) [PHQ-2 Negative - No further assessment needed] : PHQ-2 Negative - No further assessment needed [HIV Test offered] : HIV Test offered [Employed] : employed [Never] : Never [de-identified] : OCCASIONALLY [[id number]] : 0 [FreeTextEntry2] : [facility] [employer]

## 2023-05-15 ENCOUNTER — NON-APPOINTMENT (OUTPATIENT)
Age: 48
End: 2023-05-15

## 2023-05-28 LAB — HEMOCCULT STL QL IA: NEGATIVE

## 2023-05-31 ENCOUNTER — NON-APPOINTMENT (OUTPATIENT)
Age: 48
End: 2023-05-31

## 2023-07-11 ENCOUNTER — APPOINTMENT (OUTPATIENT)
Dept: GASTROENTEROLOGY | Facility: CLINIC | Age: 48
End: 2023-07-11
Payer: MEDICAID

## 2023-07-11 VITALS
SYSTOLIC BLOOD PRESSURE: 125 MMHG | OXYGEN SATURATION: 96 % | HEART RATE: 79 BPM | WEIGHT: 168 LBS | BODY MASS INDEX: 24.88 KG/M2 | DIASTOLIC BLOOD PRESSURE: 77 MMHG | HEIGHT: 69 IN

## 2023-07-11 DIAGNOSIS — Z12.11 ENCOUNTER FOR SCREENING FOR MALIGNANT NEOPLASM OF COLON: ICD-10-CM

## 2023-07-11 PROCEDURE — T1013: CPT

## 2023-07-11 PROCEDURE — 99203 OFFICE O/P NEW LOW 30 MIN: CPT | Mod: 25

## 2023-07-11 NOTE — CONSULT LETTER
[Dear  ___] : Dear  [unfilled], [Consult Letter:] : I had the pleasure of evaluating your patient, [unfilled]. [Please see my note below.] : Please see my note below. [Consult Closing:] : Thank you very much for allowing me to participate in the care of this patient.  If you have any questions, please do not hesitate to contact me. [FreeTextEntry2] : Dr. Avery Monte

## 2023-07-11 NOTE — PHYSICAL EXAM
[None] : no edema [Normal] : normal bowel sounds, non-tender, no masses, soft, no no hepato-splenomegaly [de-identified] : Deferred

## 2023-07-11 NOTE — REASON FOR VISIT
[Initial Evaluation] : an initial evaluation [Time Spent: ____ minutes] : Total time spent using  services: [unfilled] minutes. The patient's primary language is not English thus required  services. [FreeTextEntry1] : Patient is a 48-year-old male referred by his primary care physician to have a screening colonoscopy

## 2023-07-11 NOTE — HISTORY OF PRESENT ILLNESS
[de-identified] : Patient has never had an upper endoscopy [FreeTextEntry1] : Patient has never had a colonoscopy

## 2023-07-11 NOTE — ASSESSMENT
[FreeTextEntry1] : 48-year-old male referred by his primary care physician to have a screening colonoscopy.

## 2023-07-30 ENCOUNTER — TRANSCRIPTION ENCOUNTER (OUTPATIENT)
Age: 48
End: 2023-07-30

## 2023-07-31 ENCOUNTER — APPOINTMENT (OUTPATIENT)
Dept: ULTRASOUND IMAGING | Facility: CLINIC | Age: 48
End: 2023-07-31
Payer: MEDICAID

## 2023-07-31 ENCOUNTER — APPOINTMENT (OUTPATIENT)
Dept: RADIOLOGY | Facility: CLINIC | Age: 48
End: 2023-07-31
Payer: MEDICAID

## 2023-07-31 ENCOUNTER — OUTPATIENT (OUTPATIENT)
Dept: OUTPATIENT SERVICES | Facility: HOSPITAL | Age: 48
LOS: 1 days | End: 2023-07-31
Payer: COMMERCIAL

## 2023-07-31 ENCOUNTER — RESULT REVIEW (OUTPATIENT)
Age: 48
End: 2023-07-31

## 2023-07-31 ENCOUNTER — APPOINTMENT (OUTPATIENT)
Dept: GASTROENTEROLOGY | Facility: HOSPITAL | Age: 48
End: 2023-07-31

## 2023-07-31 DIAGNOSIS — Z12.11 ENCOUNTER FOR SCREENING FOR MALIGNANT NEOPLASM OF COLON: ICD-10-CM

## 2023-07-31 DIAGNOSIS — Z00.8 ENCOUNTER FOR OTHER GENERAL EXAMINATION: ICD-10-CM

## 2023-07-31 PROCEDURE — 88305 TISSUE EXAM BY PATHOLOGIST: CPT | Mod: 26

## 2023-07-31 PROCEDURE — 88305 TISSUE EXAM BY PATHOLOGIST: CPT

## 2023-07-31 PROCEDURE — 45380 COLONOSCOPY AND BIOPSY: CPT | Mod: PT

## 2023-07-31 PROCEDURE — 73564 X-RAY EXAM KNEE 4 OR MORE: CPT | Mod: 26,LT

## 2023-07-31 PROCEDURE — 76700 US EXAM ABDOM COMPLETE: CPT | Mod: 26

## 2023-07-31 PROCEDURE — 45380 COLONOSCOPY AND BIOPSY: CPT

## 2023-07-31 PROCEDURE — 73564 X-RAY EXAM KNEE 4 OR MORE: CPT

## 2023-07-31 PROCEDURE — 76700 US EXAM ABDOM COMPLETE: CPT

## 2023-07-31 DEVICE — HEMOSPRAY HEMOSTAT ENDO 7F: Type: IMPLANTABLE DEVICE | Status: FUNCTIONAL

## 2023-07-31 DEVICE — CLIP RESOLUTION 360 235CM: Type: IMPLANTABLE DEVICE | Status: FUNCTIONAL

## 2023-08-02 LAB — SURGICAL PATHOLOGY STUDY: SIGNIFICANT CHANGE UP

## 2023-11-14 ENCOUNTER — APPOINTMENT (OUTPATIENT)
Dept: INTERNAL MEDICINE | Facility: CLINIC | Age: 48
End: 2023-11-14
Payer: MEDICAID

## 2023-11-14 VITALS
BODY MASS INDEX: 25.18 KG/M2 | DIASTOLIC BLOOD PRESSURE: 70 MMHG | HEART RATE: 80 BPM | TEMPERATURE: 97.9 F | RESPIRATION RATE: 15 BRPM | OXYGEN SATURATION: 98 % | SYSTOLIC BLOOD PRESSURE: 122 MMHG | HEIGHT: 69 IN | WEIGHT: 170 LBS

## 2023-11-14 DIAGNOSIS — E78.5 HYPERLIPIDEMIA, UNSPECIFIED: ICD-10-CM

## 2023-11-14 DIAGNOSIS — R79.89 OTHER SPECIFIED ABNORMAL FINDINGS OF BLOOD CHEMISTRY: ICD-10-CM

## 2023-11-14 DIAGNOSIS — R73.03 PREDIABETES.: ICD-10-CM

## 2023-11-14 PROCEDURE — 36415 COLL VENOUS BLD VENIPUNCTURE: CPT

## 2023-11-14 PROCEDURE — 99214 OFFICE O/P EST MOD 30 MIN: CPT | Mod: 25

## 2023-11-14 RX ORDER — SODIUM SULFATE, POTASSIUM SULFATE AND MAGNESIUM SULFATE 1.6; 3.13; 17.5 G/177ML; G/177ML; G/177ML
17.5-3.13-1.6 SOLUTION ORAL
Qty: 1 | Refills: 0 | Status: DISCONTINUED | COMMUNITY
Start: 2023-07-11 | End: 2023-11-14

## 2023-11-15 LAB
ALBUMIN SERPL ELPH-MCNC: 4.5 G/DL
ALP BLD-CCNC: 62 U/L
ALT SERPL-CCNC: 24 U/L
AST SERPL-CCNC: 28 U/L
BILIRUB DIRECT SERPL-MCNC: 0.1 MG/DL
BILIRUB INDIRECT SERPL-MCNC: 0.2 MG/DL
BILIRUB SERPL-MCNC: 0.3 MG/DL
PROT SERPL-MCNC: 6.4 G/DL

## 2023-11-28 ENCOUNTER — APPOINTMENT (OUTPATIENT)
Dept: MRI IMAGING | Facility: CLINIC | Age: 48
End: 2023-11-28
Payer: MEDICAID

## 2023-11-28 ENCOUNTER — OUTPATIENT (OUTPATIENT)
Dept: OUTPATIENT SERVICES | Facility: HOSPITAL | Age: 48
LOS: 1 days | End: 2023-11-28
Payer: COMMERCIAL

## 2023-11-28 DIAGNOSIS — M25.562 PAIN IN LEFT KNEE: ICD-10-CM

## 2023-11-28 PROCEDURE — 73721 MRI JNT OF LWR EXTRE W/O DYE: CPT | Mod: 26,LT

## 2023-11-28 PROCEDURE — 73721 MRI JNT OF LWR EXTRE W/O DYE: CPT

## 2024-01-25 ENCOUNTER — APPOINTMENT (OUTPATIENT)
Dept: INTERNAL MEDICINE | Facility: CLINIC | Age: 49
End: 2024-01-25
Payer: MEDICAID

## 2024-01-25 VITALS
HEART RATE: 73 BPM | SYSTOLIC BLOOD PRESSURE: 120 MMHG | OXYGEN SATURATION: 98 % | WEIGHT: 172 LBS | DIASTOLIC BLOOD PRESSURE: 68 MMHG | HEIGHT: 69 IN | RESPIRATION RATE: 15 BRPM | TEMPERATURE: 98.2 F | BODY MASS INDEX: 25.48 KG/M2

## 2024-01-25 DIAGNOSIS — R51.9 HEADACHE, UNSPECIFIED: ICD-10-CM

## 2024-01-25 DIAGNOSIS — H54.7 UNSPECIFIED VISUAL LOSS: ICD-10-CM

## 2024-01-25 PROCEDURE — 99214 OFFICE O/P EST MOD 30 MIN: CPT

## 2024-01-25 NOTE — PHYSICAL EXAM
[No Acute Distress] : no acute distress [Well Nourished] : well nourished [Well Developed] : well developed [Well-Appearing] : well-appearing [Normal Voice/Communication] : normal voice/communication [Normal Sclera/Conjunctiva] : normal sclera/conjunctiva [Normal Oropharynx] : the oropharynx was normal [PERRL] : pupils equal round and reactive to light [No JVD] : no jugular venous distention [No Lymphadenopathy] : no lymphadenopathy [Supple] : supple [Thyroid Normal, No Nodules] : the thyroid was normal and there were no nodules present [No Respiratory Distress] : no respiratory distress  [No Accessory Muscle Use] : no accessory muscle use [Normal Rate] : normal rate  [Clear to Auscultation] : lungs were clear to auscultation bilaterally [Regular Rhythm] : with a regular rhythm [Normal S1, S2] : normal S1 and S2 [No Murmur] : no murmur heard [No Edema] : there was no peripheral edema [No Extremity Clubbing/Cyanosis] : no extremity clubbing/cyanosis [Soft] : abdomen soft [No Masses] : no abdominal mass palpated [Non Tender] : non-tender [Non-distended] : non-distended [No HSM] : no HSM [Normal Bowel Sounds] : normal bowel sounds [No Rash] : no rash [No Focal Deficits] : no focal deficits [Normal Affect] : the affect was normal [Alert and Oriented x3] : oriented to person, place, and time [Normal Mood] : the mood was normal [Normal Insight/Judgement] : insight and judgment were intact [de-identified] : No temporal artery tenderness.  No sinus tenderness. [de-identified] : No calf tenderness [de-identified] : Left knee with no tenderness or swelling

## 2024-01-25 NOTE — ASSESSMENT
[FreeTextEntry1] : ----------------------------------------------------------------------------------------------------------  Left knee pain -X-ray of left knee was normal -MRI done 2023 showed a complex tear of the medial meniscus with associated extrusion and accompanying medial compartment chondral wear.  He had a moderate joint effusion with multiple intra-articular bodies preferentially within the posterior medial joint recess -He states he has been using Advil which helps -I have again today advised that he needs to see orthopedics for further evaluation.  I gave him referral again today  Pre-DM: -HgA1c 5.9 2023 -I advised low fat/low cholesterol diet, low carbohydrate diet, and exercise  Hyperlipidemia -2023 total cholesterol was 190  Vitamin D deficiency -vitamin D3 1000 units once a day  Elevated liver enzymes -Repeat liver enzymes were normal 2023 -His hepatitis B and C serologies were negative -He had an abdominal ultrasound done which was normal  Mild headaches/worsening vision -Symptoms appear mild -He can continue using OTC Advil as needed for headaches -Advised that he see ophthalmology -He is to notify office if symptoms persist or worsen   HCM:  CPE 5/10/2023  EKG 5/10/2023   Flu shot advised: pt refused 2023  Tdap: 10/3/2018  Covid vaccine: Pfizer x 2-advised COVID booster advised   HIV testing:  negative  Hepatitis C screenin2023 negative  Hepatitis B: not immune-advised previously  Depression screenin/10/2023 PHQ 2 score 0  PSA 0.4 2023  FIT test was negative 2023  Colonoscopy: 2023-rectal polyp.  Repeat colonoscopy advised in 5 years  Follow-up in 3 months for CPE and fasting labs labs ordered and drawn in office today.  I did inform Alli that I will be moving out of state and will no longer be working at this office as of 3/1/2024.  I did advise him that he can continue his primary care with one of the other physicians in our office after 3/1/2023.  He made an appointment to see Dr. Nieves 5/15/2024

## 2024-01-25 NOTE — HISTORY OF PRESENT ILLNESS
[de-identified] : Here today for follow-up of left knee pain  He recently had an MRI which showed a complex medial meniscal tear.  He was advised to see orthopedics but he has not made an appointment yet.  He states his pain is still about the same.  He states he plans on going to see orthopedics  He also reports that from time to time he gets mild headaches.  He states headaches seem to be worse when it is cold outside.  He states Advil does help when he takes it for headaches.  He has noted that his vision has been getting a little bit worse and states he is having a harder time reading small print.  He states he has had symptoms on and off for the past few months.  He denies any eye pain or eye redness

## 2024-01-25 NOTE — REVIEW OF SYSTEMS
[Fever] : no fever [Chills] : no chills [Fatigue] : no fatigue [Chest Pain] : no chest pain [Palpitations] : no palpitations [Lower Ext Edema] : no lower extremity edema [Shortness Of Breath] : no shortness of breath [Cough] : no cough [Wheezing] : no wheezing [Dyspnea on Exertion] : not dyspnea on exertion [Abdominal Pain] : no abdominal pain [Diarrhea] : no diarrhea [Nausea] : no nausea [Vomiting] : no vomiting [Negative] : Integumentary [FreeTextEntry3] : See HPI [FreeTextEntry9] : See HPI

## 2024-01-31 ENCOUNTER — APPOINTMENT (OUTPATIENT)
Dept: ORTHOPEDIC SURGERY | Facility: CLINIC | Age: 49
End: 2024-01-31
Payer: MEDICAID

## 2024-01-31 VITALS
SYSTOLIC BLOOD PRESSURE: 128 MMHG | BODY MASS INDEX: 25.48 KG/M2 | DIASTOLIC BLOOD PRESSURE: 79 MMHG | HEART RATE: 79 BPM | WEIGHT: 172 LBS | HEIGHT: 69 IN

## 2024-01-31 PROCEDURE — 99204 OFFICE O/P NEW MOD 45 MIN: CPT

## 2024-01-31 NOTE — DISCUSSION/SUMMARY
[de-identified] : Assessment: 48-year-old male with left knee pain secondary to medial meniscus tear in the setting of early arthritis  and loose bodies  Plan: I had a long discussion with the patient today regarding the nature of their diagnosis and treatment plan.  I reviewed the patient's imaging with them today in the office which demonstrates a complex tear of the medial meniscus in the setting of early arthritis as well as intra-articular loose bodies. We discussed the risks and benefits of no treatment as well as nonoperative and operative treatments. Nonoperative treatment would include resting, icing, heating, stretching, anti-inflammatory medicines as needed, activity/lifestyle modifications, physical therapy, possible cortisone injections, and a gradual return to activities as tolerated.  The benefits of nonsurgical treatment are that it avoids the risks and rehabilitation associated with surgery.  The disadvantages of nonsurgical treatment are that it may not completely alleviate the patient's symptoms.   Surgical treatment would include a left knee arthroscopy with partial medial meniscectomy, chondroplasty, possible removal of loose bodies, and synovectomy.  I explained that some of the patient's loose bodies may be extra-articular in which case it would be left alone as there would be low risk for causing any injury to the articular surfaces and unlikely to cause pain.  The risks and benefits of surgery were discussed in detail.  The benefits include improved knee pain and function.  The risks include but are not limited to infection, blood loss, blood clots, neurovascular injury, stiffness/loss of range of motion, fracture, progressive arthritis, persistent pain, re-tearing of the meniscus, anesthesia related complications including paralysis and death, and the need for further surgery in the future.  The patient understands that they will be weightbearing as tolerated on crutches for assistance with ambulation following the surgery.  Physical therapy will be recommended in order to optimize the patient's knee function postoperatively.  I expect most patients to recover within 2 to 3 months after this procedure although some patients may take longer to fully recover.  Noncompliance with any postoperative restrictions and/or participation in physical therapy could lead to a suboptimal outcome or surgical failure.  The patient verbalizes their understanding of the surgical risks as well as the anticipated postoperative course.   Based on the patient's presence of intra-articular loose bodies I would recommend to the surgery, however, the patient is uninterested in surgery at this time.  He would like to trial nonsurgical management.  A prescription for meloxicam was sent to his pharmacy today.  Discussed the risks and benefits of NSAID use and discussed GI precautions.  Additionally, he was performed with a referral for physical therapy.  He will perform this 2 to 3 days a week for the next 6 to 8 weeks.  He will follow-up in 8 weeks for repeat clinical assessment.  If the patient fails conservative management I would recommend surgery.  The patient verbalizes understanding and agrees with the plan.  All questions were answered to their satisfaction.

## 2024-01-31 NOTE — PHYSICAL EXAM
[de-identified] : General: Awake, alert, no acute distress, Patient was cooperative and appropriate during the examination.  The patient is of normal weight for height and age.  Ambulates without an antalgic gait.  Full, painless range of motion of the neck and back.  Exam of the bilateral lower extremities is intact and symmetric with regards to dermatologic, vascular, and neurologic exam. Bilateral lower extremity sensation is grossly intact to light touch in the DP/SP/T/S/S nerve distributions. Intact DF/PF/EHL. BIlateral lower extremity warm and well-perfused with brisk capillary refill.  Pulmonary: Regular, nonlabored breathing  Abdomen: Soft, nontender, nondistended.  Lymphatic: No evidence of inguinal lymphadenopathy  Left knee Examination: Physical examination of the knee demonstrates normal skin without signs of skin changes or abnormalities. No erythema or warmth is appreciated.  No effusion.  Sensation is intact to light touch L2-S1 Palpable DP/PT pulse EHL/FHL/TA/GSC motor function intact  Range of Motion 0 to 130 degrees with pain at terminal flexion  Strength Testing Quadriceps/Hamstring 5/5 Patient is able to perform a straight leg raise without difficulty.  Palpation Not tender to palpation about the distal femur, proximal tibia, or patella No palpable defect appreciated in the quadriceps or patellar tendons Exquisitely tender to palpation of medial joint line Mildly tender to palpation of lateral joint line  Special Tests Anterior Drawer negative Posterior Drawer negative Lachman Exam negative No Varus or Valgus Laxity at 0 or 30 degrees of knee flexion Edward's Test positive medially Active compression of the patella is negative for pain Translation of the patella 2 quadrants with a firm endpoint [de-identified] : MRI of the left knee from a [facility] imaging facility on 11/28/2023 was reviewed with the patient today in the office: 1.  Complex tear of the medial meniscus with associated extrusion and accompanying medial compartment chondral wear as described. 2.  Moderate joint effusion with multiple intra-articular bodies preferentially within the posterior medial joint recess.

## 2024-01-31 NOTE — HISTORY OF PRESENT ILLNESS
[de-identified] : 1/31/2024: Alli is a pleasant 48-year-old male who presents to the office today for evaluation of left knee pain.  The patient states he has had worsening pain in his left knee for the past couple of months.  He denies any history of injury.  The pain is activity related and worse with squatting or twisting.  The pain is isolated to the medial aspect of his knee.  He has never had treatment for this before.  He recently had x-rays and MRI which demonstrated meniscus tear and he was referred to me for further evaluation.  He occasionally gets a clicking/locking sensation.  The patient denies any fevers, chills, sweats, recent illnesses, numbness, tingling, weakness, or pain elsewhere at this time.

## 2024-02-20 ENCOUNTER — NON-APPOINTMENT (OUTPATIENT)
Age: 49
End: 2024-02-20

## 2024-02-20 ENCOUNTER — APPOINTMENT (OUTPATIENT)
Dept: OPHTHALMOLOGY | Facility: CLINIC | Age: 49
End: 2024-02-20
Payer: COMMERCIAL

## 2024-02-20 ENCOUNTER — APPOINTMENT (OUTPATIENT)
Dept: OPHTHALMOLOGY | Facility: CLINIC | Age: 49
End: 2024-02-20
Payer: SELF-PAY

## 2024-02-20 PROCEDURE — ZZZZZ: CPT

## 2024-02-20 PROCEDURE — 92004 COMPRE OPH EXAM NEW PT 1/>: CPT

## 2024-02-20 PROCEDURE — 92015 DETERMINE REFRACTIVE STATE: CPT

## 2024-03-14 ENCOUNTER — APPOINTMENT (OUTPATIENT)
Dept: ORTHOPEDIC SURGERY | Facility: CLINIC | Age: 49
End: 2024-03-14
Payer: COMMERCIAL

## 2024-03-14 DIAGNOSIS — M25.562 PAIN IN LEFT KNEE: ICD-10-CM

## 2024-03-14 PROCEDURE — 99214 OFFICE O/P EST MOD 30 MIN: CPT

## 2024-03-14 NOTE — DISCUSSION/SUMMARY
[de-identified] : Assessment: 48-year-old male with left knee pain secondary to medial meniscus tear in the setting of early arthritis  and loose bodies  Plan: I had a long discussion with the patient today regarding the nature of their diagnosis and treatment plan.  I reviewed the patient's imaging with them today in the office which demonstrates a complex tear of the medial meniscus in the setting of early arthritis as well as intra-articular loose bodies. We discussed the risks and benefits of no treatment as well as nonoperative and operative treatments. Nonoperative treatment would include resting, icing, heating, stretching, anti-inflammatory medicines as needed, activity/lifestyle modifications, physical therapy, possible cortisone injections, and a gradual return to activities as tolerated.  The benefits of nonsurgical treatment are that it avoids the risks and rehabilitation associated with surgery.  The disadvantages of nonsurgical treatment are that it may not completely alleviate the patient's symptoms.   Surgical treatment would include a left knee arthroscopy with partial medial meniscectomy, chondroplasty, possible removal of loose bodies, and synovectomy.  I explained that some of the patient's loose bodies may be extra-articular in which case it would be left alone as there would be low risk for causing any injury to the articular surfaces and unlikely to cause pain.  The risks and benefits of surgery were discussed in detail.  The benefits include improved knee pain and function.  The risks include but are not limited to infection, blood loss, blood clots, neurovascular injury, stiffness/loss of range of motion, fracture, progressive arthritis, persistent pain, re-tearing of the meniscus, anesthesia related complications including paralysis and death, and the need for further surgery in the future.  The patient understands that they will be weightbearing as tolerated on crutches for assistance with ambulation following the surgery.  Physical therapy will be recommended in order to optimize the patient's knee function postoperatively.  I expect most patients to recover within 2 to 3 months after this procedure although some patients may take longer to fully recover.  Noncompliance with any postoperative restrictions and/or participation in physical therapy could lead to a suboptimal outcome or surgical failure.  The patient verbalizes their understanding of the surgical risks as well as the anticipated postoperative course.   Based on the patient's presence of intra-articular loose bodies I would recommend to the surgery, however, the patient is uninterested in surgery at this time.  He would like to think about this a little bit more and maybe consider doing the surgery and December.  I advised him that more damage could occur and we would likely need to repeat an MRI if the MRI was more than 6 months old for presurgical planning purposes.  He understands and agrees and will follow-up if he would like to reconsider surgery or reinitiate physical therapy.  He will otherwise continue to trim self symptomatically at home on his own.  Patient discussed and reviewed with Dr. Mesa today.  The patient verbalizes understanding and agrees with the plan.  All questions were answered to their satisfaction.

## 2024-03-14 NOTE — HISTORY OF PRESENT ILLNESS
[de-identified] : 03/14/2024 : ALLI PIKE  is a 49 year  old male who presents to the office for follow-up evaluation of his left knee.  He states the physical therapy and medicine did not help and he still gets cracking clicking throughout range of motion and pain over the medial aspect the knee that is worse certain activities and motions.  He states it intermittently swells.  He is here for repeat evaluation to discuss options.  He states he is not interested in surgery currently but would consider it may be later in the year when he is less busy at work.  He denies any numbness or tingling distally.  He is here with his daughter today.  1/31/2024: Alli is a pleasant 48-year-old male who presents to the office today for evaluation of left knee pain.  The patient states he has had worsening pain in his left knee for the past couple of months.  He denies any history of injury.  The pain is activity related and worse with squatting or twisting.  The pain is isolated to the medial aspect of his knee.  He has never had treatment for this before.  He recently had x-rays and MRI which demonstrated meniscus tear and he was referred to me for further evaluation.  He occasionally gets a clicking/locking sensation.  The patient denies any fevers, chills, sweats, recent illnesses, numbness, tingling, weakness, or pain elsewhere at this time.

## 2024-03-14 NOTE — PHYSICAL EXAM
[de-identified] : General: Awake, alert, no acute distress, Patient was cooperative and appropriate during the examination.  The patient is of normal weight for height and age.  Ambulates without an antalgic gait.  Full, painless range of motion of the neck and back.  Exam of the bilateral lower extremities is intact and symmetric with regards to dermatologic, vascular, and neurologic exam. Bilateral lower extremity sensation is grossly intact to light touch in the DP/SP/T/S/S nerve distributions. Intact DF/PF/EHL. BIlateral lower extremity warm and well-perfused with brisk capillary refill.  Pulmonary: Regular, nonlabored breathing  Abdomen: Soft, nontender, nondistended.  Lymphatic: No evidence of inguinal lymphadenopathy  Left knee Examination: Physical examination of the knee demonstrates normal skin without signs of skin changes or abnormalities. No erythema or warmth is appreciated.  No effusion.  Sensation is intact to light touch L2-S1 Palpable DP/PT pulse EHL/FHL/TA/GSC motor function intact  Range of Motion 0 to 130 degrees with pain at terminal flexion  Strength Testing Quadriceps/Hamstring 5/5 Patient is able to perform a straight leg raise without difficulty.  Palpation Not tender to palpation about the distal femur, proximal tibia, or patella No palpable defect appreciated in the quadriceps or patellar tendons Moderately tender to palpation of medial joint line Mildly tender to palpation of lateral joint line  Special Tests Anterior Drawer negative Posterior Drawer negative Lachman Exam negative No Varus or Valgus Laxity at 0 or 30 degrees of knee flexion Edward's Test positive medially Active compression of the patella is negative for pain Translation of the patella 2 quadrants with a firm endpoint [de-identified] : MRI of the left knee from a [facility] imaging facility on 11/28/2023 was reviewed with the patient today in the office: 1.  Complex tear of the medial meniscus with associated extrusion and accompanying medial compartment chondral wear as described. 2.  Moderate joint effusion with multiple intra-articular bodies preferentially within the posterior medial joint recess.

## 2024-05-15 ENCOUNTER — APPOINTMENT (OUTPATIENT)
Dept: INTERNAL MEDICINE | Facility: CLINIC | Age: 49
End: 2024-05-15

## 2024-06-27 ENCOUNTER — APPOINTMENT (OUTPATIENT)
Dept: DERMATOLOGY | Facility: CLINIC | Age: 49
End: 2024-06-27
Payer: COMMERCIAL

## 2024-06-27 DIAGNOSIS — L81.1 CHLOASMA: ICD-10-CM

## 2024-06-27 DIAGNOSIS — D40.8 NEOPLASM OF UNCERTAIN BEHAVIOR OF OTHER SPECIFIED MALE GENITAL ORGANS: ICD-10-CM

## 2024-06-27 DIAGNOSIS — L81.4 OTHER MELANIN HYPERPIGMENTATION: ICD-10-CM

## 2024-06-27 DIAGNOSIS — L82.1 OTHER SEBORRHEIC KERATOSIS: ICD-10-CM

## 2024-06-27 PROCEDURE — 54100 BIOPSY OF PENIS: CPT

## 2024-06-27 PROCEDURE — 99203 OFFICE O/P NEW LOW 30 MIN: CPT | Mod: 25

## 2024-07-09 RX ORDER — IMIQUIMOD 50 MG/G
5 CREAM TOPICAL
Qty: 1 | Refills: 5 | Status: ACTIVE | COMMUNITY
Start: 2024-07-09 | End: 1900-01-01

## 2024-09-10 ENCOUNTER — NON-APPOINTMENT (OUTPATIENT)
Age: 49
End: 2024-09-10

## 2024-09-11 ENCOUNTER — APPOINTMENT (OUTPATIENT)
Dept: DERMATOLOGY | Facility: CLINIC | Age: 49
End: 2024-09-11
Payer: COMMERCIAL

## 2024-09-11 DIAGNOSIS — A63.0 ANOGENITAL (VENEREAL) WARTS: ICD-10-CM

## 2024-09-11 PROCEDURE — 54056 CRYOSURGERY PENIS LESION(S): CPT

## 2024-09-11 PROCEDURE — 99213 OFFICE O/P EST LOW 20 MIN: CPT | Mod: 25

## 2024-09-11 RX ORDER — IMIQUIMOD 50 MG/G
5 CREAM TOPICAL
Qty: 1 | Refills: 5 | Status: ACTIVE | COMMUNITY
Start: 2024-09-11 | End: 1900-01-01

## 2024-09-11 NOTE — PHYSICAL EXAM
[Alert] : alert [Oriented x 3] : ~L oriented x 3 [Well Nourished] : well nourished [FreeTextEntry3] : Penis: Multiple small thin tan slightly verrucous papules

## 2024-09-11 NOTE — HISTORY OF PRESENT ILLNESS
[FreeTextEntry1] : Genital warts [de-identified] : Follow-up visit for 49-year-old  male last seen by me on June 27, 2024 (at which time he had a full skin exam) with lesions on the penis. Biopsy read as "benign squamous lesion consistent with wart. No high-grade dysplasia is identified"  Treated with 5% imiquimod cream applied at night 3 days/week for 2-1/2 months.  Presents today for further treatment.

## 2024-09-11 NOTE — PLAN
[TextEntry] : Treat several lesions on the proximal and mid dorsal shaft (proximal to the scar from the biopsy) with cryosurgery  Verbal consent obtained.  Potential risks including oozing, blister formation, post-inflammatory hypo/hyperpigmentation discussed.  Cryosurgery using liquid nitrogen spray applied for 7 seconds X 2 given to 10+ lesions on the mid and proximal dorsal shaft  Patient told the wound(s) may develop oozing, crusting or blisters  Increase 5% imiquimod cream to the other lesions at night 6 days/week -not to treated lesion  Return 2 months

## 2024-12-24 ENCOUNTER — APPOINTMENT (OUTPATIENT)
Dept: DERMATOLOGY | Facility: CLINIC | Age: 49
End: 2024-12-24

## (undated) DEVICE — SENSOR O2 FINGER XL ADULT 24/BX 6BX/CA

## (undated) DEVICE — TRAP QUICK CATCH  SINGL CHAMBER

## (undated) DEVICE — CLAMP BX HOT RAD JAW 3

## (undated) DEVICE — MARKER ENDO SPOT EX

## (undated) DEVICE — FORCEP RADIAL JAW 4 W NDL 2.4MM 2.8MM 240CM ORANGE DISP

## (undated) DEVICE — FORCEP RADIAL JAW 4 JUMBO 2.8MM 3.2MM 240CM ORANGE DISP

## (undated) DEVICE — MASK OXYGEN PANORAMIC

## (undated) DEVICE — SUT HEWSON RETRIEVER

## (undated) DEVICE — TUBING IV SET SECONDARY 34"

## (undated) DEVICE — ENDOCUFF VISION SZ 2 LG GRN

## (undated) DEVICE — SOL IRR POUR H2O 500ML

## (undated) DEVICE — CANISTER SUCTION 1200CC 10/SL

## (undated) DEVICE — SOL IRR NS 0.9% 250ML

## (undated) DEVICE — POLY TRAP ETRAP

## (undated) DEVICE — TUBING SUCTION CONN 6FT STERILE

## (undated) DEVICE — SYR LUER SLIP TIP 30CC

## (undated) DEVICE — PACK IV START WITH CHG

## (undated) DEVICE — TUBE O2 SUPL CRUSH RESIS CONN SOUTHSIDE ONLY

## (undated) DEVICE — BRUSH COLONOSCOPY CYTOLOGY

## (undated) DEVICE — TUBE RECTAL 24FR

## (undated) DEVICE — STERIS DEFENDO 3-PIECE KIT (AIR/WATER, SUCTION & BIOPSY VALVES)

## (undated) DEVICE — SET IV PUMP BLOOD 1VALVE 180FILTER NON-DEHP

## (undated) DEVICE — SNARE POLYP SENS 27MM 240CM

## (undated) DEVICE — TUBING IV SET GRAVITY 3Y 100" MACRO

## (undated) DEVICE — FORMALIN CUPS 10% BUFFERED

## (undated) DEVICE — CATH IV SAFE BC 20G X 1.16" (PINK)

## (undated) DEVICE — SYR LUER SLIP TIP 50CC

## (undated) DEVICE — TUBING CANNULA SALTER LABS NASAL ADULT 7FT

## (undated) DEVICE — ENDOCUFF VISION SZ 3 SM PRPL

## (undated) DEVICE — VALVE BIOPSY

## (undated) DEVICE — SUCTION YANKAUER TAPERED BULBOUS NO VENT

## (undated) DEVICE — RETRIEVER ROTH NET PLATINUM-UNIVERSAL

## (undated) DEVICE — MASK O2 NON REBREATH 3IN1 ADULT

## (undated) DEVICE — CATH IV SAFE BC 22G X 1" (BLUE)

## (undated) DEVICE — CATH ELCTR GLIDE PRB 7FR

## (undated) DEVICE — CATH ELECHMSTAT  INJ 7FR 210CM

## (undated) DEVICE — Device

## (undated) DEVICE — SNARE LRG

## (undated) DEVICE — NDL INJ SCLERO INTERJECT 23G

## (undated) DEVICE — ELCTR GROUNDING PAD ADULT COVIDIEN

## (undated) DEVICE — TRAP SPECIMEN SPUTUM 40CC

## (undated) DEVICE — SYR IV POSIFLUSH NS 3ML 30/TY